# Patient Record
Sex: MALE | Race: WHITE | NOT HISPANIC OR LATINO | URBAN - METROPOLITAN AREA
[De-identification: names, ages, dates, MRNs, and addresses within clinical notes are randomized per-mention and may not be internally consistent; named-entity substitution may affect disease eponyms.]

---

## 2017-07-06 ENCOUNTER — AMBULATORY - HEALTHEAST (OUTPATIENT)
Dept: FAMILY MEDICINE | Facility: CLINIC | Age: 47
End: 2017-07-06

## 2017-07-06 DIAGNOSIS — Z01.00 ENCOUNTER FOR VISION SCREENING: ICD-10-CM

## 2019-01-29 ENCOUNTER — OFFICE VISIT - HEALTHEAST (OUTPATIENT)
Dept: FAMILY MEDICINE | Facility: CLINIC | Age: 49
End: 2019-01-29

## 2019-01-29 DIAGNOSIS — H61.23 BILATERAL IMPACTED CERUMEN: ICD-10-CM

## 2019-01-29 DIAGNOSIS — R53.83 FATIGUE, UNSPECIFIED TYPE: ICD-10-CM

## 2019-01-29 DIAGNOSIS — M79.661 PAIN OF RIGHT LOWER LEG: ICD-10-CM

## 2019-01-29 LAB
ALBUMIN SERPL-MCNC: 4.4 G/DL (ref 3.5–5)
ALP SERPL-CCNC: 78 U/L (ref 45–120)
ALT SERPL W P-5'-P-CCNC: 20 U/L (ref 0–45)
ANION GAP SERPL CALCULATED.3IONS-SCNC: 12 MMOL/L (ref 5–18)
AST SERPL W P-5'-P-CCNC: 27 U/L (ref 0–40)
BASOPHILS # BLD AUTO: 0 THOU/UL (ref 0–0.2)
BASOPHILS NFR BLD AUTO: 1 % (ref 0–2)
BILIRUB SERPL-MCNC: 0.7 MG/DL (ref 0–1)
BUN SERPL-MCNC: 16 MG/DL (ref 8–22)
CALCIUM SERPL-MCNC: 9.6 MG/DL (ref 8.5–10.5)
CHLORIDE BLD-SCNC: 103 MMOL/L (ref 98–107)
CO2 SERPL-SCNC: 27 MMOL/L (ref 22–31)
CREAT SERPL-MCNC: 1.18 MG/DL (ref 0.7–1.3)
EOSINOPHIL # BLD AUTO: 0.1 THOU/UL (ref 0–0.4)
EOSINOPHIL NFR BLD AUTO: 2 % (ref 0–6)
ERYTHROCYTE [DISTWIDTH] IN BLOOD BY AUTOMATED COUNT: 11.5 % (ref 11–14.5)
ERYTHROCYTE [SEDIMENTATION RATE] IN BLOOD BY WESTERGREN METHOD: 2 MM/HR (ref 0–15)
GFR SERPL CREATININE-BSD FRML MDRD: >60 ML/MIN/1.73M2
GLUCOSE BLD-MCNC: 85 MG/DL (ref 70–125)
HBA1C MFR BLD: 5.2 % (ref 3.5–6)
HCT VFR BLD AUTO: 47.2 % (ref 40–54)
HGB BLD-MCNC: 15.9 G/DL (ref 14–18)
LYMPHOCYTES # BLD AUTO: 1.8 THOU/UL (ref 0.8–4.4)
LYMPHOCYTES NFR BLD AUTO: 34 % (ref 20–40)
MAGNESIUM SERPL-MCNC: 2.6 MG/DL (ref 1.8–2.6)
MCH RBC QN AUTO: 34 PG (ref 27–34)
MCHC RBC AUTO-ENTMCNC: 33.6 G/DL (ref 32–36)
MCV RBC AUTO: 101 FL (ref 80–100)
MONOCYTES # BLD AUTO: 0.3 THOU/UL (ref 0–0.9)
MONOCYTES NFR BLD AUTO: 6 % (ref 2–10)
NEUTROPHILS # BLD AUTO: 2.9 THOU/UL (ref 2–7.7)
NEUTROPHILS NFR BLD AUTO: 57 % (ref 50–70)
PLATELET # BLD AUTO: 145 THOU/UL (ref 140–440)
PMV BLD AUTO: 7.9 FL (ref 7–10)
POTASSIUM BLD-SCNC: 4.1 MMOL/L (ref 3.5–5)
PROT SERPL-MCNC: 7.2 G/DL (ref 6–8)
RBC # BLD AUTO: 4.66 MILL/UL (ref 4.4–6.2)
SODIUM SERPL-SCNC: 142 MMOL/L (ref 136–145)
TSH SERPL DL<=0.005 MIU/L-ACNC: 1.1 UIU/ML (ref 0.3–5)
VIT B12 SERPL-MCNC: 364 PG/ML (ref 213–816)
WBC: 5.1 THOU/UL (ref 4–11)

## 2019-01-31 LAB — ANA SER QL: 0.1 U

## 2019-06-03 ENCOUNTER — OFFICE VISIT - HEALTHEAST (OUTPATIENT)
Dept: FAMILY MEDICINE | Facility: CLINIC | Age: 49
End: 2019-06-03

## 2019-06-03 DIAGNOSIS — H61.23 BILATERAL IMPACTED CERUMEN: ICD-10-CM

## 2019-06-10 ENCOUNTER — OFFICE VISIT - HEALTHEAST (OUTPATIENT)
Dept: FAMILY MEDICINE | Facility: CLINIC | Age: 49
End: 2019-06-10

## 2019-06-10 DIAGNOSIS — H69.92 ETD (EUSTACHIAN TUBE DYSFUNCTION), LEFT: ICD-10-CM

## 2019-07-22 ENCOUNTER — AMBULATORY - HEALTHEAST (OUTPATIENT)
Dept: FAMILY MEDICINE | Facility: CLINIC | Age: 49
End: 2019-07-22

## 2019-07-22 DIAGNOSIS — Z01.00 ENCOUNTER FOR VISION SCREENING: ICD-10-CM

## 2019-09-13 ENCOUNTER — RECORDS - HEALTHEAST (OUTPATIENT)
Dept: ADMINISTRATIVE | Facility: OTHER | Age: 49
End: 2019-09-13

## 2021-02-02 ENCOUNTER — OFFICE VISIT - HEALTHEAST (OUTPATIENT)
Dept: FAMILY MEDICINE | Facility: CLINIC | Age: 51
End: 2021-02-02

## 2021-02-02 ENCOUNTER — COMMUNICATION - HEALTHEAST (OUTPATIENT)
Dept: FAMILY MEDICINE | Facility: CLINIC | Age: 51
End: 2021-02-02

## 2021-02-02 DIAGNOSIS — R58 ECCHYMOSIS: ICD-10-CM

## 2021-02-02 ASSESSMENT — ANXIETY QUESTIONNAIRES
1. FEELING NERVOUS, ANXIOUS, OR ON EDGE: SEVERAL DAYS
5. BEING SO RESTLESS THAT IT IS HARD TO SIT STILL: NOT AT ALL
6. BECOMING EASILY ANNOYED OR IRRITABLE: NOT AT ALL
7. FEELING AFRAID AS IF SOMETHING AWFUL MIGHT HAPPEN: NOT AT ALL
2. NOT BEING ABLE TO STOP OR CONTROL WORRYING: NOT AT ALL
GAD7 TOTAL SCORE: 1
3. WORRYING TOO MUCH ABOUT DIFFERENT THINGS: NOT AT ALL
4. TROUBLE RELAXING: NOT AT ALL
IF YOU CHECKED OFF ANY PROBLEMS ON THIS QUESTIONNAIRE, HOW DIFFICULT HAVE THESE PROBLEMS MADE IT FOR YOU TO DO YOUR WORK, TAKE CARE OF THINGS AT HOME, OR GET ALONG WITH OTHER PEOPLE: SOMEWHAT DIFFICULT

## 2021-02-02 ASSESSMENT — PATIENT HEALTH QUESTIONNAIRE - PHQ9: SUM OF ALL RESPONSES TO PHQ QUESTIONS 1-9: 2

## 2021-05-27 ASSESSMENT — PATIENT HEALTH QUESTIONNAIRE - PHQ9: SUM OF ALL RESPONSES TO PHQ QUESTIONS 1-9: 2

## 2021-05-28 ASSESSMENT — ANXIETY QUESTIONNAIRES: GAD7 TOTAL SCORE: 1

## 2021-05-29 NOTE — PATIENT INSTRUCTIONS - HE
Ears were irrigated to day, no sign of infection.      Can try Debrox drops to prevent build up of cerumen.  When using Q tips, try to use them only at the opening of the ear canal to wick water from them, do not insert them into the canal as this can impact the wax.    Return with any further problems.

## 2021-05-29 NOTE — PROGRESS NOTES
ASSESSMENT:   1. Bilateral impacted cerumen  Ear cerumen removal       PLAN:  Ears irrigated by clinic staff to remove cerumen.  Significant amount of cerumen removed bilaterally.  Repeat examination reveals clear ear canals without erythema or swelling.  Bilateral TMs pearly, normal landmarks and light reflexes.    I discussed red flag symptoms, return precautions to clinic/ER and follow up care with patient/parent.  Expected clinical course, symptomatic cares advised. Questions answered. Patient/parent amenable with plan.    Patient Instructions:  Patient Instructions   Ears were irrigated to day, no sign of infection.      Can try Debrox drops to prevent build up of cerumen.  When using Q tips, try to use them only at the opening of the ear canal to wick water from them, do not insert them into the canal as this can impact the wax.    Return with any further problems.      SUBJECTIVE:   Familia Dye is a 48 y.o. male who presents today with bilateral decreased hearing, has had recent URI symptoms about a week ago that are now resolved.  No pain, however feels pressure in the left ear.  No otorrhea, fevers, chills.  Had cerumen impaction about a year ago.  Did attempt to clean with Q tips.  Uses ear pods for running.      ROS:  Comprehensive 12 pt ROS completed, positives noted in HPI, otherwise negative.      Past Medical History:  Patient Active Problem List   Diagnosis     Finger Injury     Skin Neoplasm Of Uncertain Behavior     Fatigue     Depression With Anxiety     Lump In / On The Skin     Joint Pain, Localized In The Shoulder       Surgical History:  Past Surgical History:   Procedure Laterality Date     HAND SURGERY       lipoma removal       SHOULDER SURGERY Left 8/18/2014    Procedure: WITH MINI OPEN BICEPS TENODESIS;  Surgeon: Feliberto Calle MD;  Location: [facility];  Service:            Family History:  No family history on file.    Reviewed; Non-contributory    Social History      Tobacco Use   Smoking Status Never Smoker   Smokeless Tobacco Never Used       Current Medications:  Current Outpatient Medications on File Prior to Visit   Medication Sig Dispense Refill     multivitamin (ONE A DAY) per tablet Take 1 tablet by mouth daily.       No current facility-administered medications on file prior to visit.        Allergies:   Allergies   Allergen Reactions     Tobramycin Swelling     Eyelid swelling with eye drops       OBJECTIVE:   Vitals:    06/03/19 0740   BP: 125/78   Patient Site: Right Arm   Patient Position: Sitting   Cuff Size: Adult Regular   Pulse: 66   Temp: 98.5  F (36.9  C)   TempSrc: Oral   SpO2: 97%   Weight: 204 lb (92.5 kg)     Physical exam reveals a pleasant 48 y.o. male.   General: Appears healthy, alert and cooperative. Non-toxic appearance.  Eyes:  No conjunctivitis, lids normal.   Ears:  Normal appearing auricle. External auditory meatus without excessive cerumen, edema or erythema. both TM's could not see and both canals ceruminous and ceruminous: irrigated.  No mastoid tenderness. No pain with palpation over tragus.  Nose:    Mucosa normal. No rhinorrhea. No sinus tenderness.  Mouth:  Mucosa pink and moist.  no pharyngitis, no exudate. No trismus. No evidence of PTA. Normal phonation.  Neck: no adenopathy  Pulmonary/Chest: even, unlabored resp.  Heart: regular rate   Neuro: Alert, oriented. Non-focal.  Skin: pink, warm, dry, and without lesions on limited skin exam. No rashes.  Psychiatric: Normal mood and affect.  Normal judgement and thought content. Normal behavior.       RADIOLOGY    none  LABORATORY STUDIES    none      Kalpana Theodore, ANITA

## 2021-06-02 VITALS — WEIGHT: 203.7 LBS | BODY MASS INDEX: 28.81 KG/M2

## 2021-06-03 VITALS — BODY MASS INDEX: 28.86 KG/M2 | WEIGHT: 204 LBS

## 2021-06-03 VITALS — WEIGHT: 202 LBS | BODY MASS INDEX: 28.57 KG/M2

## 2021-06-05 VITALS
DIASTOLIC BLOOD PRESSURE: 83 MMHG | OXYGEN SATURATION: 97 % | SYSTOLIC BLOOD PRESSURE: 122 MMHG | RESPIRATION RATE: 20 BRPM | TEMPERATURE: 98.3 F | BODY MASS INDEX: 33.24 KG/M2 | WEIGHT: 235 LBS | HEART RATE: 70 BPM

## 2021-06-17 NOTE — PATIENT INSTRUCTIONS - HE
Patient Instructions by Mike Bangura PA-C at 6/10/2019  5:30 PM     Author: Mike Bangura PA-C Service: -- Author Type: Physician Assistant    Filed: 6/10/2019  6:14 PM Encounter Date: 6/10/2019 Status: Addendum    : Mike Bangura PA-C (Physician Assistant)    Related Notes: Original Note by Mike Bangura PA-C (Physician Assistant) filed at 6/10/2019  6:13 PM       Use of over-the-counter Zyrtec.  Follow packaging directions  Use of over-the-counter Flonase  Frequent swallowing drinking and chewing gum to help create low-grade suction on the eustachian tube.  Elevate head at nighttime so it does not increase pressure  Use of over-the-counter Tylenol as needed for comfort.  Return to primary care provider for reevaluation treatment if any complication or new symptoms should present.        Patient Education     Earache, No Infection (Adult)  Earaches can happen without an infection. This occurs when air and fluid build up behind the eardrum causing a feeling of fullness and discomfort and reduced hearing. This is called otitis media with effusion (OME) or serous otitis media. It means there is fluid in the middle ear. It is not the same as acute otitis media, which is typically from infection.  OME can happen when you have a cold if congestion blocks the passage that drains the middle ear. This passage is called the eustachian tube. OME may also occur with nasal allergies or after a bacterial middle ear infection.    The pain or discomfort may come and go. You may hear clicking or popping sounds when you chew or swallow. You may feel that your balance is off. Or you may hear ringing in the ear.  It often takes from several weeks up to 3 months for the fluid to clear on its own. Oral pain relievers and ear drops help if there is pain. Decongestants and antihistamines sometimes help. Antibiotics don't help since there is no infection. Your doctor may prescribe a nasal spray to help reduce swelling in the nose  and eustachian tube. This can allow the ear to drain.  If your OME doesn't improve after 3 months, surgery may be used to drain the fluid and insert a small tube in the eardrum to allow continued drainage.  Because the middle ear fluid can become infected, it is important to watch for signs of an ear infection which may develop later. These signs include increased ear pain, fever, or drainage from the ear.  Home care  The following guidelines will help you care for yourself at home:    You may use over-the-counter medicine as directed to control pain, unless another medicine was prescribed. If you have chronic liver or kidney disease or ever had a stomach ulcer or GI bleeding, talk with your doctor before using these medicines. Aspirin should never be used in anyone under 18 years of age who is ill with a fever. It may cause severe liver damage.    You may use over-the-counter decongestants such as phenylephrine or pseudoephedrine. But they are not always helpful. Don't use nasal spray decongestants more than 3 days. Longer use can make congestion worse. Prescription nasal sprays from your doctor don't typically have those restrictions.    Antihistamines may help if you are also having allergy symptoms.    You may use medicines such as guaifenesin to thin mucus and promote drainage.  Follow-up care  Follow up with your healthcare provider or as advised if you are not feeling better after 3 days.  When to seek medical advice  Call your healthcare provider right away if any of the following occur:    Your ear pain gets worse or does not start to improve     Fever of 100.4 F (38 C) or higher, or as directed by your healthcare provider    Fluid or blood draining from the ear    Headache or sinus pain    Stiff neck    Unusual drowsiness or confusion  Date Last Reviewed: 10/1/2016    6420-1828 [employer]. [address] [place] [address], [place] [address]. All rights reserved. This information is not intended as a  substitute for professional medical care. Always follow your healthcare professional's instructions.

## 2021-06-23 NOTE — PROGRESS NOTES
ASSESSMENT/PLAN:       1. Fatigue, unspecified type  -Discussed possible etiologies including thyroid disorder, nutritional deficiency given his decreased oral intake and weight loss, certainly would consider sleep apnea although less likely with his recent weight loss, and mental health issues.  Checking basic labs as listed below including a thyroid cascade as well as a B12 and an A1c given his paresthesias.  If his labs are all unremarkable he will continue to work on diet and exercise and perhaps consider increasing his caloric intake by 100-200 sepideh a day to see if this helps with his energy level.  If he continues to have issues may consider referral for sleep study versus further workup.  He is overall comfortable this and will follow up with me as needed.  - Thyroid Cascade  - HM1(CBC and Differential)  - Comprehensive Metabolic Panel  - Magnesium  - Vitamin B12  - Glycosylated Hemoglobin A1c  - Antinuclear Antibodies Screen ([facility])  - Erythrocyte Sedimentation Rate  - HM1 (CBC with Diff)    2. Pain of right lower leg  -Discussed that this is likely SI joint dysfunction given his exam and location of the pain, and I did provide him with some low back exercises to try to continue to strengthen this area.  If he continues to struggle with issues he should let me know and we can consider referral to PT versus further workup.    3.  Cerumen impaction  -Bilateral cerumen impaction, discussed head phone usage with his exercise, he will use some Debrox at home and let me know if things are not clearing and we can certainly flush him out here.  Small amount of wax was removed with a curette just to see the eardrum, unable to see the left eardrum.      Return if symptoms worsen or fail to improve.      Kitty Pandey MD      PROGRESS NOTE   1/29/2019    SUBJECTIVE:  Familia NEO Dye is a 48 y.o. male  who presents for follow up.     He does have some right leg tightness, it is doing better with stretching.  He does find that if he drives with his foot rotated out it is painfl. He can have some wrappingown the posterior thigh, can wrap around the knee to the back of the calf. He does not have groin pain. If he stretches off to the side it can hurt some as well.     Family does have a history of joint and back issues.     He is very fatigued. He is down about 60-70 pounds in the last 15 months. He doesn't have a ton of fat on him, does think that itis low. He is running nearly daily, helping with stress. He does find that there are days where he feels quite wiped out. He does find that at times it will sneak up on him. He can't tell if it is all fatigue or if there is some anxiety at times. He was at the [employer] and started feeling off, when they left he was done, had to get away from everyone and needed to be alone the rest of the day. Since he dropped weight if he does get sick it doesn't show a lot. Sometimes he doesn't know if he is hydrating enough. He is urinating clear when he drinks. He has had instances where he a sharp pain up the front of his neck, feeling like pressure. He did wake up one night where he had pressure in the front of his neck, feeling and chest tightness.     He does feel that he is below 2000 calories per day. When he runs he will run 2-3 miles a day. 400 calories at a time. He is generally burning 800 calories on his fitbit. He does watch his pulse with this at night as well.     He does get a cold feeling as well at times. The cold will go right to pain. With the fatigue, he will feel cold on the inside and it goes out. He will have issues warming up.     There have been times where his sweat smells like ammonia. He is not sure where this is coming from.   Chief Complaint   Patient presents with     Leg Pain     Patient is here today to discus right leg tightness. Typically happens during or after exercise.      Low Energy     Patient states towards the end of the week, he typically feels like  he is lacking energy, feeling fatigue and mentally not there. When the patient feels fatigue, he has a sense of coldness inside and is unable to get warm.          Patient Active Problem List   Diagnosis     Finger Injury     Skin Neoplasm Of Uncertain Behavior     Fatigue     Depression With Anxiety     Lump In / On The Skin     Joint Pain, Localized In The Shoulder       Current Outpatient Medications   Medication Sig Dispense Refill     multivitamin (ONE A DAY) per tablet Take 1 tablet by mouth daily.       ibuprofen (ADVIL,MOTRIN) 200 MG tablet Take 200 mg by mouth every 6 (six) hours as needed for pain.       polymyxin B sulf-trimethoprim (POLYTRIM) 10,000 unit- 1 mg/mL Drop ophthalmic drops One drop to each eye four times daily for three days 1 Bottle 0     No current facility-administered medications for this visit.        Social History     Tobacco Use   Smoking Status Never Smoker   Smokeless Tobacco Never Used           OBJECTIVE:        Recent Results (from the past 240 hour(s))   Glycosylated Hemoglobin A1c   Result Value Ref Range    Hemoglobin A1c 5.2 3.5 - 6.0 %   Erythrocyte Sedimentation Rate   Result Value Ref Range    Sed Rate 2 0 - 15 mm/hr   HM1 (CBC with Diff)   Result Value Ref Range    WBC 5.1 4.0 - 11.0 thou/uL    RBC 4.66 4.40 - 6.20 mill/uL    Hemoglobin 15.9 14.0 - 18.0 g/dL    Hematocrit 47.2 40.0 - 54.0 %     (H) 80 - 100 fL    MCH 34.0 27.0 - 34.0 pg    MCHC 33.6 32.0 - 36.0 g/dL    RDW 11.5 11.0 - 14.5 %    Platelets 145 140 - 440 thou/uL    MPV 7.9 7.0 - 10.0 fL    Neutrophils % 57 50 - 70 %    Lymphocytes % 34 20 - 40 %    Monocytes % 6 2 - 10 %    Eosinophils % 2 0 - 6 %    Basophils % 1 0 - 2 %    Neutrophils Absolute 2.9 2.0 - 7.7 thou/uL    Lymphocytes Absolute 1.8 0.8 - 4.4 thou/uL    Monocytes Absolute 0.3 0.0 - 0.9 thou/uL    Eosinophils Absolute 0.1 0.0 - 0.4 thou/uL    Basophils Absolute 0.0 0.0 - 0.2 thou/uL       Vitals:    01/29/19 1248   BP: 124/86   Patient  Site: Left Arm   Patient Position: Sitting   Cuff Size: Adult Regular   Pulse: 74   SpO2: 99%   Weight: 203 lb 11.2 oz (92.4 kg)     Weight: 203 lb 11.2 oz (92.4 kg)          Physical Exam:  GENERAL APPEARANCE: A&A, NAD, well hydrated, well nourished  SKIN:  Normal skin turgor, no lesions/rashes   HEENT: moist mucous membranes, no rhinorrhea, pharynx unremarkable, tympanic membrane's are occluded by wax bilaterally, right is easily seen with moving the ear wax with a curette, left would need to be flushed  NECK: Normal, without lymphadenopathy  CV: RRR, no M/G/R   LUNGS: CTAB  ABDOMEN: S&NT, no masses   Back: No tenderness palpation in the midline, mild right SI joint tenderness to palpation  EXTREMITY: no edema   NEURO: no gross deficits, normal patellar and Achilles reflexes bilaterally, gait is appropriate, normal biceps and brachioradialis reflexes as well  Psych: His affect is appropriate, eye contact is fair, he is well-dressed and groomed, his thought process and speech pattern are normal

## 2021-06-27 NOTE — PROGRESS NOTES
Progress Notes by Mike Bangura PA-C at 6/10/2019  5:30 PM     Author: Mike Bangura PA-C Service: -- Author Type: Physician Assistant    Filed: 6/11/2019  7:31 AM Encounter Date: 6/10/2019 Status: Signed    : Mike Bangura PA-C (Physician Assistant)       Subjective:      Patient ID: Familia Dye is a 48 y.o. male.    Chief Complaint:    HPI  Familia Dye is a 48 y.o. male who presents today complaining of right-sided ear pain since this morning.  Patient states that he has had congestion in his head and was recently seen for cerumen impaction on 6/3/2019.  He had a cerumen removed which had helped.  He did have congestion in the ears at that time and has been using Sudafed over-the-counter without relief of the fullness in the ears.  He is ostensibly here for reevaluation of the congestion and fullness in the ears.  He has had slight decrease in hearing popping and clicking when he chews and swallows and some increased pain when he lays down at nighttime.  At this time is had no otorrhea overt hearing loss fever chills or night sweats.    Past Medical History:   Diagnosis Date   ? Cellulitis    ? Depression    ? Joint pain        Past Surgical History:   Procedure Laterality Date   ? HAND SURGERY     ? lipoma removal     ? SHOULDER SURGERY Left 8/18/2014    Procedure: WITH MINI OPEN BICEPS TENODESIS;  Surgeon: Feliberto Calle MD;  Location: [facility];  Service:        No family history on file.    Social History     Tobacco Use   ? Smoking status: Never Smoker   ? Smokeless tobacco: Never Used   Substance Use Topics   ? Alcohol use: No   ? Drug use: No       Review of Systems  As above in HPI, otherwise balance of Review of Systems are negative.    Objective:     /75 (Patient Site: Right Arm, Patient Position: Sitting, Cuff Size: Adult Large)   Pulse 68   Temp 98.1  F (36.7  C) (Oral)   Resp 16   Wt 202 lb (91.6 kg)   SpO2 98%   BMI 28.57 kg/m      Physical Exam  General:  Patient is resting comfortably no acute distress is afebrile  HEENT: Head is normocephalic atraumatic   eyes are PERRL EOMI sclera anicteric   TMs on the right is with fluid   EAC on the left is with mild cerumen  TM on the left is with fluid in the middle ear  EAC on the left is with no cerumen  Throat is without pharyngeal wall erythema and no exudate  No cervical lymphadenopathy present  LUNGS: Clear to auscultation bilaterally  HEART: Regular rate and rhythm  Skin: Without rash non-diaphoretic    Assessment:     Procedures    The encounter diagnosis was ETD (Eustachian tube dysfunction), left.    Plan:     1. ETD (Eustachian tube dysfunction), left  fluticasone propionate (FLONASE ALLERGY RELIEF) 50 mcg/actuation nasal spray         Patient Instructions   Use of over-the-counter Zyrtec.  Follow packaging directions  Use of over-the-counter Flonase  Frequent swallowing drinking and chewing gum to help create low-grade suction on the eustachian tube.  Elevate head at nighttime so it does not increase pressure  Use of over-the-counter Tylenol as needed for comfort.  Return to primary care provider for reevaluation treatment if any complication or new symptoms should present.        Patient Education     Earache, No Infection (Adult)  Earaches can happen without an infection. This occurs when air and fluid build up behind the eardrum causing a feeling of fullness and discomfort and reduced hearing. This is called otitis media with effusion (OME) or serous otitis media. It means there is fluid in the middle ear. It is not the same as acute otitis media, which is typically from infection.  OME can happen when you have a cold if congestion blocks the passage that drains the middle ear. This passage is called the eustachian tube. OME may also occur with nasal allergies or after a bacterial middle ear infection.    The pain or discomfort may come and go. You may hear clicking or popping sounds when you chew or swallow.  You may feel that your balance is off. Or you may hear ringing in the ear.  It often takes from several weeks up to 3 months for the fluid to clear on its own. Oral pain relievers and ear drops help if there is pain. Decongestants and antihistamines sometimes help. Antibiotics don't help since there is no infection. Your doctor may prescribe a nasal spray to help reduce swelling in the nose and eustachian tube. This can allow the ear to drain.  If your OME doesn't improve after 3 months, surgery may be used to drain the fluid and insert a small tube in the eardrum to allow continued drainage.  Because the middle ear fluid can become infected, it is important to watch for signs of an ear infection which may develop later. These signs include increased ear pain, fever, or drainage from the ear.  Home care  The following guidelines will help you care for yourself at home:    You may use over-the-counter medicine as directed to control pain, unless another medicine was prescribed. If you have chronic liver or kidney disease or ever had a stomach ulcer or GI bleeding, talk with your doctor before using these medicines. Aspirin should never be used in anyone under 18 years of age who is ill with a fever. It may cause severe liver damage.    You may use over-the-counter decongestants such as phenylephrine or pseudoephedrine. But they are not always helpful. Don't use nasal spray decongestants more than 3 days. Longer use can make congestion worse. Prescription nasal sprays from your doctor don't typically have those restrictions.    Antihistamines may help if you are also having allergy symptoms.    You may use medicines such as guaifenesin to thin mucus and promote drainage.  Follow-up care  Follow up with your healthcare provider or as advised if you are not feeling better after 3 days.  When to seek medical advice  Call your healthcare provider right away if any of the following occur:    Your ear pain gets worse or  does not start to improve     Fever of 100.4 F (38 C) or higher, or as directed by your healthcare provider    Fluid or blood draining from the ear    Headache or sinus pain    Stiff neck    Unusual drowsiness or confusion  Date Last Reviewed: 10/1/2016    4233-8196 [employer]. [address] [place] [address]. All rights reserved. This information is not intended as a substitute for professional medical care. Always follow your healthcare professional's instructions.

## 2021-06-30 NOTE — PROGRESS NOTES
"Progress Notes by Day Monroy PA-C at 2/2/2021  3:40 PM     Author: Day Monroy PA-C Service: -- Author Type: Physician Assistant    Filed: 2/2/2021  4:31 PM Encounter Date: 2/2/2021 Status: Signed    : Day Monroy PA-C (Physician Assistant)         Chief Complaint   Patient presents with   ? Mass     bump on Rt calf it bruised over night        HPI:  Familia Dye is a 50 y.o. male who presents for evaluation of bruise to R calf onset today. He noticed a mobile, pea sized bump on his R mid calf yesterday that was nontender. He manipulated it and applied pressure and felt a \"pop\". The bump is no longer there but today he noticed bruising where the bump was yesterday. He feels the bruising has mildly increased in size throughout the day. He does have a cyst on his R forearm that has been there for many years, and has had lipomas in the past. No treatments tried. Patient reports no fever/chills, drainage, R leg swelling, or any other symptoms. Patient reports no history of DVT/PE, recent travel/surgery, tobacco use, or history of cancer.      Problem List:  Cellulitis  Finger Injury  Skin Neoplasm Of Uncertain Behavior  Fatigue  Community-acquired Pneumonia  Fever (Symptom)  Depression With Anxiety  Lump In / On The Skin  Joint Pain, Localized In The Shoulder      Vitals:    02/02/21 1547   BP: 122/83   Patient Site: Left Arm   Patient Position: Sitting   Cuff Size: Adult Large   Pulse: 70   Resp: 20   Temp: 98.3  F (36.8  C)   TempSrc: Oral   SpO2: 97%   Weight: (!) 235 lb (106.6 kg)       Physical Exam   Constitutional: He appears well-developed and well-nourished.  Non-toxic appearance.   Cardiovascular: Normal rate, regular rhythm and normal heart sounds.   Pulmonary/Chest: Effort normal and breath sounds normal.   Musculoskeletal:        Legs:    Neurological: He is alert.   Skin: Ecchymosis noted.       Labs:  No results found for this or any previous visit (from " the past 24 hour(s)).    Radiology:  No results found.    Clinical Decision Making:    Suspect pt had epidermoid or ganglion cyst which he popped when applying pressure last night, and caused superficial bruising. No indication of DVT or cellulitis. Given return precautions. Apply ACE bandage and apply ice to help with bruising.    See patient instructions below.    At the end of the encounter, I discussed results, diagnosis, medications. Discussed red flags for immediate return to clinic/ER, as well as indications for follow up if no improvement. Patient understood and agreed to plan. Patient was stable for discharge.    1. Ecchymosis           Return in about 1 week (around 2/9/2021) for Follow up w/ primary care provider if not improved.    DENA Bradford, PADONN  [facility]    Patient Instructions   If fever, redness, or increased pain/swelling develop, return to urgent care.

## 2021-08-08 ENCOUNTER — HEALTH MAINTENANCE LETTER (OUTPATIENT)
Age: 51
End: 2021-08-08

## 2021-10-03 ENCOUNTER — HEALTH MAINTENANCE LETTER (OUTPATIENT)
Age: 51
End: 2021-10-03

## 2021-11-17 ENCOUNTER — LAB (OUTPATIENT)
Dept: FAMILY MEDICINE | Facility: CLINIC | Age: 51
End: 2021-11-17
Attending: PHYSICIAN ASSISTANT

## 2021-11-17 ENCOUNTER — E-VISIT (OUTPATIENT)
Dept: URGENT CARE | Facility: URGENT CARE | Age: 51
End: 2021-11-17
Payer: COMMERCIAL

## 2021-11-17 DIAGNOSIS — Z20.822 SUSPECTED COVID-19 VIRUS INFECTION: ICD-10-CM

## 2021-11-17 DIAGNOSIS — Z20.822 SUSPECTED COVID-19 VIRUS INFECTION: Primary | ICD-10-CM

## 2021-11-17 PROCEDURE — 99421 OL DIG E/M SVC 5-10 MIN: CPT | Performed by: PHYSICIAN ASSISTANT

## 2021-11-17 PROCEDURE — U0005 INFEC AGEN DETEC AMPLI PROBE: HCPCS

## 2021-11-17 PROCEDURE — U0003 INFECTIOUS AGENT DETECTION BY NUCLEIC ACID (DNA OR RNA); SEVERE ACUTE RESPIRATORY SYNDROME CORONAVIRUS 2 (SARS-COV-2) (CORONAVIRUS DISEASE [COVID-19]), AMPLIFIED PROBE TECHNIQUE, MAKING USE OF HIGH THROUGHPUT TECHNOLOGIES AS DESCRIBED BY CMS-2020-01-R: HCPCS

## 2021-11-17 NOTE — PATIENT INSTRUCTIONS
Dear Familia Dye,    Your symptoms show that you may have coronavirus (COVID-19). This illness can cause fever, cough and trouble breathing. Many people get a mild case and get better on their own. Some people can get very sick.    Will I be tested for COVID-19?  We would like to test you for Covid-19 virus. I have placed orders for this test.     To schedule: go to your [employer] home page and scroll down to the section that says  You have an appointment that needs to be scheduled  and click the large green button that says  Schedule Now  and follow the steps to find the next available openings.    If you are unable to complete these [employer] scheduling steps, please call [phone] to schedule your testing.     Return to work/school/ guidance:  Please let your workplace manager and staffing office know when your quarantine ends     We can t give you an exact date as it depends on the above. You can calculate this on your own or work with your manager/staffing office to calculate this. (For example if you were exposed on 10/4, you would have to quarantine for 14 full days. That would be through 10/18. You could return on 10/19.)      If you receive a positive COVID-19 test result, follow the guidance of the those who are giving you the results. Usually the return to work is 10 (or in some cases 20 days from symptom onset.) If you work at [facility], you must also be cleared by Employee Occupational Health and Safety to return to work.        If you receive a negative COVID-19 test result and did not have a high risk exposure to someone with a known positive COVID-19 test, you can return to work once you're free of fever for 24 hours without fever-reducing medication and your symptoms are improving or resolved.      If you receive a negative COVID-19 test and If you had a high risk exposure to someone who has tested positive for COVID-19 then you can return to work 14 days after your last  contact with the positive individual    Note: If you have ongoing exposure to the covid positive person, this quarantine period may be more than 14 days. (For example, if you are continued to be exposed to your child who tested positive and cannot isolate from them, then the quarantine of 7-14 days can't start until your child is no longer contagious. This is typically 10 days from onset of the child's symptoms. So the total duration may be 17-24 days in this case.)    Sign up for [email or web address].   We know it's scary to hear that you might have COVID-19. We want to track your symptoms to make sure you're okay over the next 2 weeks. Please look for an email from [email or web address]--this is a free, online program that we'll use to keep in touch. To sign up, follow the link in the email you will receive. Learn more at [email or web address]    How can I take care of myself?    Get lots of rest. Drink extra fluids (unless a doctor has told you not to)    Take Tylenol (acetaminophen) or ibuprofen for fever or pain. If you have liver or kidney problems, ask your family doctor if it's okay to take Tylenol o ibuprofen    If you have other health problems (like cancer, heart failure, an organ transplant or severe kidney disease): Call your specialty clinic if you don't feel better in the next 2 days.    Know when to call 911. Emergency warning signs include:  o Trouble breathing or shortness of breath  o Pain or pressure in the chest that doesn't go away  o Feeling confused like you haven't felt before, or not being able to wake up  o Bluish-colored lips or face    Where can I get more information?  [facility] - About COVID-19:   [email or web address]    CDC - What to Do If You're Sick:   www.cdc.gov/coronavirus/2019-ncov/about/steps-when-sick.html    November 17, 2021  RE:  Familia Dye                                                                                                                   [address], [address]  [place] [address]      To whom it may concern:    I evaluated Familia Dye on November 17, 2021. Familia Dye should be excused from work/school.     They should let their workplace manager and staffing office know when their quarantine ends.    We can not give an exact date as it depends on the information below. They can calculate this on their own or work with their manager/staffing office to calculate this. (For example if they were exposed on 10/04, they would have to quarantine for 14 full days. That would be through 10/18. They could return on 10/19.)    Quarantine Guidelines:      If patient receives a positive COVID-19 test result, they should follow the guidance of those who are giving the results. Usually the return to work is 10 (or in some cases 20 days from symptom onset.) If they work at [employer], they must be cleared by Employee Occupational Health and Safety to return to work.        If patient receives a negative COVID-19 test result and did not have a high risk exposure to someone with a known positive COVID-19 test, they can return to work once they're free of fever for 24 hours without fever-reducing medication and their symptoms are improving or resolved.      If patient receives a negative COVID-19 test and if they had a high risk exposure to someone who has tested positive for COVID-19 then they can return to work 14 days after their last contact with the positive individual    Note: If there is ongoing exposure to the covid positive person, this quarantine period may be longer than 14 days. (For example, if they are continually exposed to their child, who tested positive and cannot isolate from them, then the quarantine of 7-14 days can't start until their child is no longer contagious. This is typically 10 days from onset to the child's symptoms. So the total duration may be 17-24 days in this case.)     Sincerely,  Mya Jin  BRENDAN

## 2021-11-18 LAB — SARS-COV-2 RNA RESP QL NAA+PROBE: NEGATIVE

## 2022-01-18 ENCOUNTER — IMMUNIZATION (OUTPATIENT)
Dept: NURSING | Facility: CLINIC | Age: 52
End: 2022-01-18
Payer: COMMERCIAL

## 2022-01-18 PROCEDURE — 91305 COVID-19,PF,PFIZER (12+ YRS): CPT

## 2022-01-18 PROCEDURE — 0054A COVID-19,PF,PFIZER (12+ YRS): CPT

## 2022-05-15 ENCOUNTER — HEALTH MAINTENANCE LETTER (OUTPATIENT)
Age: 52
End: 2022-05-15

## 2022-09-10 ENCOUNTER — HEALTH MAINTENANCE LETTER (OUTPATIENT)
Age: 52
End: 2022-09-10

## 2022-09-26 ENCOUNTER — OFFICE VISIT (OUTPATIENT)
Dept: FAMILY MEDICINE | Facility: CLINIC | Age: 52
End: 2022-09-26
Payer: COMMERCIAL

## 2022-09-26 VITALS
DIASTOLIC BLOOD PRESSURE: 93 MMHG | BODY MASS INDEX: 33.1 KG/M2 | WEIGHT: 234 LBS | OXYGEN SATURATION: 97 % | TEMPERATURE: 99.3 F | SYSTOLIC BLOOD PRESSURE: 142 MMHG | HEART RATE: 64 BPM | RESPIRATION RATE: 16 BRPM

## 2022-09-26 DIAGNOSIS — L42 PITYRIASIS ROSEA: Primary | ICD-10-CM

## 2022-09-26 PROCEDURE — 99213 OFFICE O/P EST LOW 20 MIN: CPT | Performed by: PHYSICIAN ASSISTANT

## 2022-09-26 NOTE — PROGRESS NOTES
Patient presents with:  Derm Problem: Rash torso neck arm and some on legs itching       Clinical Decision Making:  Rash appearance and distribution is most consistent with pityriasis rosea.  We discussed pruritus management with the use of oral Zyrtec.  Patient was reassured that this is self-limiting typically within 2 to 3 weeks.    ICD-10-CM    1. Pityriasis rosea  L42        Patient Instructions   Pityriasis Rosea  Pityriasis rosea is a type of skin rash. It starts with one large round or oval scaly patch called the herald patch, and then causes many more small patches. The rash most often appears on the chest, back, and belly. It can take 1 to 2 months to go away.   Pityriasis rosea is a harmless non-contagious rash. The exact cause is unknown. It's not an allergic reaction, and doesn't seem to be caused by a viral or fungal infection. Although anyone can get it, it's most often seen in children and young adults ages 10 to 35.   This condition usually resolves on its own without treatment in 2 weeks.  In some people it may take 6 to 8 weeks to clear up. Once it s gone, it usually doesn t come back.   Home care    For dry skin, use a moisturizing cream. For itchiness, use 1% hydrocortisone cream (no prescription needed) or calamine lotion 2 to 3 times a day.    Exposure to natural sunlight helps some people. It may help fade the rash, but doesn't seem to help the itching. Don't overdo it in the sun, as your skin may be more sensitive than usual. You don t want to burn yourself. Artificial sun lamps, sun beds, and tanning salons are not recommended.    This condition is not contagious. Your child may attend  or school while the rash is present.  Medicines  Talk with your healthcare provider before using any medicines. All medicines have side effects.     Medicines will not get rid of the rash.     Moisturizing skin creams may help.    Antihistamines may help with itching, but they can make you sleepy. I  recommend over the counter Zyrtec 10 mg daily taken in the morning.   Follow-up care  Follow up with your healthcare provider, or as advised. Call your provider if the itching is not controlled by the above suggestions, or if the rash lasts longer than 3 months.   When to seek medical advice  Call your healthcare provider right away if any of these occur:     You develop a rash and are pregnant    Severe itching    Signs of infection in the skin (increasing redness, drainage of pus, yellow-brown scabs)    Fever or other symptoms of a new illness       HPI:  Familia Dye is a 51 year old male who presents today complaining of rash on the body that started 5 days ago.  Rash is itchy.  Patient is otherwise feeling well and healthy.    History obtained from the patient.    Problem List:  Finger Injury  Skin Neoplasm Of Uncertain Behavior  Fatigue  Depression With Anxiety  Lump In / On The Skin  Joint Pain, Localized In The Shoulder      Past Medical History:   Diagnosis Date     Cellulitis      Depression      Joint pain        Social History     Tobacco Use     Smoking status: Never Smoker     Smokeless tobacco: Never Used   Substance Use Topics     Alcohol use: No       Review of Systems   Skin: Positive for rash.   All other systems reviewed and are negative.      Vitals:    09/26/22 1055   BP: (!) 142/93   Pulse: 64   Resp: 16   Temp: 99.3  F (37.4  C)   TempSrc: Oral   SpO2: 97%   Weight: 106.1 kg (234 lb)       Physical Exam  Vitals and nursing note reviewed.   Constitutional:       General: He is not in acute distress.     Appearance: He is not toxic-appearing or diaphoretic.   HENT:      Head: Normocephalic and atraumatic.      Right Ear: External ear normal.      Left Ear: External ear normal.   Eyes:      Conjunctiva/sclera: Conjunctivae normal.   Pulmonary:      Effort: Pulmonary effort is normal. No respiratory distress.   Skin:     Comments: Erythematous patch like rash diffusely present on trunk and  sparsely present on arms and legs.  No vesicles present.   Neurological:      Mental Status: He is alert.   Psychiatric:         Mood and Affect: Mood normal.         Behavior: Behavior normal.         Thought Content: Thought content normal.         Judgment: Judgment normal.         At the end of the encounter, I discussed results, diagnosis, medications. Discussed red flags for immediate return to clinic/ER, as well as indications for follow up if no improvement. Patient understood and agreed to plan. Patient was stable for discharge.

## 2022-09-26 NOTE — PATIENT INSTRUCTIONS
Pityriasis Rosea  Pityriasis rosea is a type of skin rash. It starts with one large round or oval scaly patch called the herald patch, and then causes many more small patches. The rash most often appears on the chest, back, and belly. It can take 1 to 2 months to go away.   Pityriasis rosea is a harmless non-contagious rash. The exact cause is unknown. It's not an allergic reaction, and doesn't seem to be caused by a viral or fungal infection. Although anyone can get it, it's most often seen in children and young adults ages 10 to 35.   This condition usually resolves on its own without treatment in 2 weeks.  In some people it may take 6 to 8 weeks to clear up. Once it s gone, it usually doesn t come back.   Home care  For dry skin, use a moisturizing cream. For itchiness, use 1% hydrocortisone cream (no prescription needed) or calamine lotion 2 to 3 times a day.  Exposure to natural sunlight helps some people. It may help fade the rash, but doesn't seem to help the itching. Don't overdo it in the sun, as your skin may be more sensitive than usual. You don t want to burn yourself. Artificial sun lamps, sun beds, and tanning salons are not recommended.  This condition is not contagious. Your child may attend  or school while the rash is present.  Medicines  Talk with your healthcare provider before using any medicines. All medicines have side effects.   Medicines will not get rid of the rash.   Moisturizing skin creams may help.  Antihistamines may help with itching, but they can make you sleepy. I recommend over the counter Zyrtec 10 mg daily taken in the morning.   Follow-up care  Follow up with your healthcare provider, or as advised. Call your provider if the itching is not controlled by the above suggestions, or if the rash lasts longer than 3 months.   When to seek medical advice  Call your healthcare provider right away if any of these occur:   You develop a rash and are pregnant  Severe itching  Signs  of infection in the skin (increasing redness, drainage of pus, yellow-brown scabs)  Fever or other symptoms of a new illness

## 2023-10-01 ENCOUNTER — HEALTH MAINTENANCE LETTER (OUTPATIENT)
Age: 53
End: 2023-10-01

## 2024-11-24 ENCOUNTER — HEALTH MAINTENANCE LETTER (OUTPATIENT)
Age: 54
End: 2024-11-24

## 2025-01-12 ENCOUNTER — OFFICE VISIT (OUTPATIENT)
Dept: URGENT CARE | Facility: URGENT CARE | Age: 55
End: 2025-01-12
Payer: COMMERCIAL

## 2025-01-12 VITALS
HEART RATE: 71 BPM | OXYGEN SATURATION: 98 % | RESPIRATION RATE: 18 BRPM | TEMPERATURE: 98.3 F | DIASTOLIC BLOOD PRESSURE: 97 MMHG | SYSTOLIC BLOOD PRESSURE: 157 MMHG

## 2025-01-12 DIAGNOSIS — J02.9 VIRAL PHARYNGITIS: Primary | ICD-10-CM

## 2025-01-12 LAB
DEPRECATED S PYO AG THROAT QL EIA: NEGATIVE
FLUAV AG SPEC QL IA: NEGATIVE
FLUBV AG SPEC QL IA: NEGATIVE
S PYO DNA THROAT QL NAA+PROBE: NOT DETECTED

## 2025-01-12 PROCEDURE — 87804 INFLUENZA ASSAY W/OPTIC: CPT | Performed by: PHYSICIAN ASSISTANT

## 2025-01-12 PROCEDURE — 87651 STREP A DNA AMP PROBE: CPT | Performed by: PHYSICIAN ASSISTANT

## 2025-01-12 PROCEDURE — 99213 OFFICE O/P EST LOW 20 MIN: CPT | Performed by: PHYSICIAN ASSISTANT

## 2025-01-12 NOTE — PROGRESS NOTES
Assessment & Plan:      Problem List Items Addressed This Visit    None  Visit Diagnoses       Viral pharyngitis    -  Primary    Relevant Medications    magic mouthwash suspension (diphenhydrAMINE, lidocaine, aluminum-magnesium & simethicone)    Other Relevant Orders    Streptococcus A Rapid Screen w/Reflex to PCR - Clinic Collect (Completed)    Influenza A & B Antigen - Clinic Collect (Completed)    Group A Streptococcus PCR Throat Swab          Medical Decision Making  Patient presents with severe sore throat associated with cough and bodyaches progressing over the last 2 to 3 days.  Rapid strep and influenza are negative.  Symptoms are still consistent with influenza-like illness.  Recommend trial of Magic mouthwash to help with severe throat pains.  Otherwise continue with conservative measures.  No signs of respiratory distress.  Discussed treatment and symptomatic care.  Allergies and medication interactions reviewed.  Discussed signs of worsening symptoms and when to follow-up with PCP if no symptom improvement.     Subjective:      Familia Dye is a 54 year old male here for evaluation of sore throat, cough, body aches.  Onset of symptoms was 2 to 3 days ago.  Patient notes very severe sore throat that keeps him up at nighttime.  He has tried over-the-counter analgesics so water gargles, and honey with no benefit.     The following portions of the patient's history were reviewed and updated as appropriate: allergies, current medications, and problem list.     Review of Systems  Pertinent items are noted in HPI.    Allergies  Allergies   Allergen Reactions    Tobramycin Swelling     Eyelid swelling with eye drops       No family history on file.    Social History     Tobacco Use    Smoking status: Never    Smokeless tobacco: Never   Substance Use Topics    Alcohol use: No        Objective:      BP (!) 157/97   Pulse 71   Temp 98.3  F (36.8  C) (Oral)   Resp 18   SpO2 98%   General appearance -  alert, well appearing, and in no distress and non-toxic  Mouth -posterior oropharynx is significantly erythematous with tonsils previously removed, no exudate  Neck - supple, no significant adenopathy  Chest - clear to auscultation, no wheezes, rales or rhonchi, symmetric air entry    The use of Dragon/[employer] dictation services was used to construct the content of this note; any grammatical errors are non-intentional. Please contact the author directly if you are in need of any clarification.